# Patient Record
Sex: FEMALE | Race: WHITE | Employment: PART TIME | URBAN - METROPOLITAN AREA
[De-identification: names, ages, dates, MRNs, and addresses within clinical notes are randomized per-mention and may not be internally consistent; named-entity substitution may affect disease eponyms.]

---

## 2017-11-21 ENCOUNTER — TELEPHONE (OUTPATIENT)
Dept: FAMILY MEDICINE CLINIC | Age: 22
End: 2017-11-21

## 2017-11-21 RX ORDER — AZITHROMYCIN 250 MG/1
TABLET, FILM COATED ORAL
Qty: 1 PACKET | Refills: 0 | Status: SHIPPED | OUTPATIENT
Start: 2017-11-21 | End: 2017-12-01

## 2017-12-11 RX ORDER — ETONOGESTREL/ETHINYL ESTRADIOL .12-.015MG
RING, VAGINAL VAGINAL
Qty: 3 EACH | Refills: 2 | Status: SHIPPED | OUTPATIENT
Start: 2017-12-11 | End: 2018-08-17 | Stop reason: SDUPTHER

## 2018-08-17 RX ORDER — ETONOGESTREL/ETHINYL ESTRADIOL .12-.015MG
RING, VAGINAL VAGINAL
Qty: 4 EACH | Refills: 3 | Status: SHIPPED | OUTPATIENT
Start: 2018-08-17

## 2022-12-04 PROBLEM — F33.0 MILD EPISODE OF RECURRENT MAJOR DEPRESSIVE DISORDER (HCC): Status: ACTIVE | Noted: 2019-04-04

## 2022-12-04 RX ORDER — FLUOXETINE HYDROCHLORIDE 40 MG/1
80 CAPSULE ORAL DAILY
COMMUNITY
Start: 2019-05-07 | End: 2022-12-05 | Stop reason: ALTCHOICE

## 2022-12-04 SDOH — HEALTH STABILITY: PHYSICAL HEALTH: ON AVERAGE, HOW MANY DAYS PER WEEK DO YOU ENGAGE IN MODERATE TO STRENUOUS EXERCISE (LIKE A BRISK WALK)?: 1 DAY

## 2022-12-04 SDOH — HEALTH STABILITY: PHYSICAL HEALTH: ON AVERAGE, HOW MANY MINUTES DO YOU ENGAGE IN EXERCISE AT THIS LEVEL?: 30 MIN

## 2022-12-04 ASSESSMENT — SOCIAL DETERMINANTS OF HEALTH (SDOH)
WITHIN THE LAST YEAR, HAVE YOU BEEN HUMILIATED OR EMOTIONALLY ABUSED IN OTHER WAYS BY YOUR PARTNER OR EX-PARTNER?: NO
WITHIN THE LAST YEAR, HAVE YOU BEEN AFRAID OF YOUR PARTNER OR EX-PARTNER?: NO
WITHIN THE LAST YEAR, HAVE TO BEEN RAPED OR FORCED TO HAVE ANY KIND OF SEXUAL ACTIVITY BY YOUR PARTNER OR EX-PARTNER?: NO
WITHIN THE LAST YEAR, HAVE YOU BEEN KICKED, HIT, SLAPPED, OR OTHERWISE PHYSICALLY HURT BY YOUR PARTNER OR EX-PARTNER?: NO

## 2022-12-04 ASSESSMENT — ENCOUNTER SYMPTOMS
COUGH: 0
SHORTNESS OF BREATH: 0
DIARRHEA: 0
CONSTIPATION: 0
VOMITING: 0
ABDOMINAL PAIN: 0
NAUSEA: 0

## 2022-12-05 ENCOUNTER — OFFICE VISIT (OUTPATIENT)
Dept: FAMILY MEDICINE CLINIC | Age: 27
End: 2022-12-05
Payer: MEDICAID

## 2022-12-05 ENCOUNTER — NURSE ONLY (OUTPATIENT)
Dept: LAB | Age: 27
End: 2022-12-05

## 2022-12-05 VITALS
HEIGHT: 67 IN | WEIGHT: 181.8 LBS | BODY MASS INDEX: 28.53 KG/M2 | SYSTOLIC BLOOD PRESSURE: 118 MMHG | HEART RATE: 82 BPM | RESPIRATION RATE: 16 BRPM | DIASTOLIC BLOOD PRESSURE: 78 MMHG | TEMPERATURE: 97.5 F

## 2022-12-05 DIAGNOSIS — Z11.4 ENCOUNTER FOR SCREENING FOR HIV: ICD-10-CM

## 2022-12-05 DIAGNOSIS — Z23 FLU VACCINE NEED: ICD-10-CM

## 2022-12-05 DIAGNOSIS — Z13.31 ENCOUNTER FOR SCREENING FOR DEPRESSION: ICD-10-CM

## 2022-12-05 DIAGNOSIS — F33.0 MILD EPISODE OF RECURRENT MAJOR DEPRESSIVE DISORDER (HCC): ICD-10-CM

## 2022-12-05 DIAGNOSIS — Z11.1 VISIT FOR TB SKIN TEST: ICD-10-CM

## 2022-12-05 DIAGNOSIS — Z11.59 ENCOUNTER FOR HEPATITIS C SCREENING TEST FOR LOW RISK PATIENT: ICD-10-CM

## 2022-12-05 DIAGNOSIS — Z00.00 WELL ADULT EXAM: Primary | ICD-10-CM

## 2022-12-05 DIAGNOSIS — G47.00 INSOMNIA, UNSPECIFIED TYPE: ICD-10-CM

## 2022-12-05 DIAGNOSIS — Z23 NEED FOR COVID-19 VACCINE: ICD-10-CM

## 2022-12-05 LAB
ALBUMIN SERPL-MCNC: 4.6 G/DL (ref 3.5–5.1)
ALP BLD-CCNC: 52 U/L (ref 38–126)
ALT SERPL-CCNC: 13 U/L (ref 11–66)
ANION GAP SERPL CALCULATED.3IONS-SCNC: 16 MEQ/L (ref 8–16)
AST SERPL-CCNC: 14 U/L (ref 5–40)
BASOPHILS # BLD: 0.7 %
BASOPHILS ABSOLUTE: 0.1 THOU/MM3 (ref 0–0.1)
BILIRUB SERPL-MCNC: 0.3 MG/DL (ref 0.3–1.2)
BUN BLDV-MCNC: 10 MG/DL (ref 7–22)
CALCIUM SERPL-MCNC: 9.5 MG/DL (ref 8.5–10.5)
CHLORIDE BLD-SCNC: 104 MEQ/L (ref 98–111)
CO2: 21 MEQ/L (ref 23–33)
CREAT SERPL-MCNC: 0.6 MG/DL (ref 0.4–1.2)
EOSINOPHIL # BLD: 0.9 %
EOSINOPHILS ABSOLUTE: 0.1 THOU/MM3 (ref 0–0.4)
ERYTHROCYTE [DISTWIDTH] IN BLOOD BY AUTOMATED COUNT: 12.8 % (ref 11.5–14.5)
ERYTHROCYTE [DISTWIDTH] IN BLOOD BY AUTOMATED COUNT: 39.8 FL (ref 35–45)
GFR SERPL CREATININE-BSD FRML MDRD: > 60 ML/MIN/1.73M2
GLUCOSE BLD-MCNC: 93 MG/DL (ref 70–108)
HCT VFR BLD CALC: 35.3 % (ref 37–47)
HEMOGLOBIN: 11.8 GM/DL (ref 12–16)
HEPATITIS C ANTIBODY: NEGATIVE
IMMATURE GRANS (ABS): 0.02 THOU/MM3 (ref 0–0.07)
IMMATURE GRANULOCYTES: 0.2 %
LYMPHOCYTES # BLD: 20.5 %
LYMPHOCYTES ABSOLUTE: 1.8 THOU/MM3 (ref 1–4.8)
MCH RBC QN AUTO: 28.8 PG (ref 26–33)
MCHC RBC AUTO-ENTMCNC: 33.4 GM/DL (ref 32.2–35.5)
MCV RBC AUTO: 86.1 FL (ref 81–99)
MONOCYTES # BLD: 5.8 %
MONOCYTES ABSOLUTE: 0.5 THOU/MM3 (ref 0.4–1.3)
NUCLEATED RED BLOOD CELLS: 0 /100 WBC
PLATELET # BLD: 328 THOU/MM3 (ref 130–400)
PMV BLD AUTO: 10.6 FL (ref 9.4–12.4)
POTASSIUM SERPL-SCNC: 4.4 MEQ/L (ref 3.5–5.2)
RBC # BLD: 4.1 MILL/MM3 (ref 4.2–5.4)
SEG NEUTROPHILS: 71.9 %
SEGMENTED NEUTROPHILS ABSOLUTE COUNT: 6.3 THOU/MM3 (ref 1.8–7.7)
SODIUM BLD-SCNC: 141 MEQ/L (ref 135–145)
T4 FREE: 1.33 NG/DL (ref 0.93–1.76)
TOTAL PROTEIN: 7.1 G/DL (ref 6.1–8)
TSH SERPL DL<=0.05 MIU/L-ACNC: 1.8 UIU/ML (ref 0.4–4.2)
WBC # BLD: 8.8 THOU/MM3 (ref 4.8–10.8)

## 2022-12-05 PROCEDURE — 86580 TB INTRADERMAL TEST: CPT | Performed by: FAMILY MEDICINE

## 2022-12-05 PROCEDURE — 90674 CCIIV4 VAC NO PRSV 0.5 ML IM: CPT | Performed by: FAMILY MEDICINE

## 2022-12-05 PROCEDURE — 99385 PREV VISIT NEW AGE 18-39: CPT | Performed by: STUDENT IN AN ORGANIZED HEALTH CARE EDUCATION/TRAINING PROGRAM

## 2022-12-05 PROCEDURE — 0124A COVID-19, PFIZER BIVALENT BOOSTER, (AGE 12Y+), IM, 30 MCG/0.3 ML: CPT | Performed by: FAMILY MEDICINE

## 2022-12-05 PROCEDURE — 90471 IMMUNIZATION ADMIN: CPT | Performed by: FAMILY MEDICINE

## 2022-12-05 PROCEDURE — 96160 PT-FOCUSED HLTH RISK ASSMT: CPT | Performed by: STUDENT IN AN ORGANIZED HEALTH CARE EDUCATION/TRAINING PROGRAM

## 2022-12-05 PROCEDURE — 91312 COVID-19, PFIZER BIVALENT BOOSTER, (AGE 12Y+), IM, 30 MCG/0.3 ML: CPT | Performed by: FAMILY MEDICINE

## 2022-12-05 RX ORDER — M-VIT,TX,IRON,MINS/CALC/FOLIC 27MG-0.4MG
1 TABLET ORAL DAILY
COMMUNITY

## 2022-12-05 RX ORDER — FLUOXETINE HYDROCHLORIDE 20 MG/1
20 CAPSULE ORAL DAILY
Qty: 30 CAPSULE | Refills: 3 | Status: SHIPPED | OUTPATIENT
Start: 2022-12-05

## 2022-12-05 SDOH — ECONOMIC STABILITY: FOOD INSECURITY: WITHIN THE PAST 12 MONTHS, YOU WORRIED THAT YOUR FOOD WOULD RUN OUT BEFORE YOU GOT MONEY TO BUY MORE.: NEVER TRUE

## 2022-12-05 SDOH — ECONOMIC STABILITY: FOOD INSECURITY: WITHIN THE PAST 12 MONTHS, THE FOOD YOU BOUGHT JUST DIDN'T LAST AND YOU DIDN'T HAVE MONEY TO GET MORE.: NEVER TRUE

## 2022-12-05 ASSESSMENT — PATIENT HEALTH QUESTIONNAIRE - PHQ9
4. FEELING TIRED OR HAVING LITTLE ENERGY: 1
SUM OF ALL RESPONSES TO PHQ9 QUESTIONS 1 & 2: 0
5. POOR APPETITE OR OVEREATING: 0
9. THOUGHTS THAT YOU WOULD BE BETTER OFF DEAD, OR OF HURTING YOURSELF: 0
7. TROUBLE CONCENTRATING ON THINGS, SUCH AS READING THE NEWSPAPER OR WATCHING TELEVISION: 1
1. LITTLE INTEREST OR PLEASURE IN DOING THINGS: 0
3. TROUBLE FALLING OR STAYING ASLEEP: 3
SUM OF ALL RESPONSES TO PHQ QUESTIONS 1-9: 5
2. FEELING DOWN, DEPRESSED OR HOPELESS: 0
6. FEELING BAD ABOUT YOURSELF - OR THAT YOU ARE A FAILURE OR HAVE LET YOURSELF OR YOUR FAMILY DOWN: 0
SUM OF ALL RESPONSES TO PHQ QUESTIONS 1-9: 5
8. MOVING OR SPEAKING SO SLOWLY THAT OTHER PEOPLE COULD HAVE NOTICED. OR THE OPPOSITE, BEING SO FIGETY OR RESTLESS THAT YOU HAVE BEEN MOVING AROUND A LOT MORE THAN USUAL: 0
10. IF YOU CHECKED OFF ANY PROBLEMS, HOW DIFFICULT HAVE THESE PROBLEMS MADE IT FOR YOU TO DO YOUR WORK, TAKE CARE OF THINGS AT HOME, OR GET ALONG WITH OTHER PEOPLE: 0

## 2022-12-05 ASSESSMENT — SOCIAL DETERMINANTS OF HEALTH (SDOH): HOW HARD IS IT FOR YOU TO PAY FOR THE VERY BASICS LIKE FOOD, HOUSING, MEDICAL CARE, AND HEATING?: NOT HARD AT ALL

## 2022-12-05 NOTE — PROGRESS NOTES
[id number] [facility]. [address]  [facility] [address]  Dept: [phone]  Loc: [phone]     Jerrod Alfonso is a 28 y.o. female who presents today for:  Chief Complaint   Patient presents with    New Patient     Establish care, previously saw a physician in [place]         Goals    None         HPI:     HPI  48-year-old female with PMH of anxiety, depression    PMH: depression, anxiety, bicornate uterus  PSH: omphalaocele, cholecystectomy  Family hx: father (gout),mother (diabetes, hypertension) maternal grandfather (dementia, diabetes), maternal grandmother (breast cancer, emphysema. Heart failure),  Social history: Medical student    Interval history:    Depression/anxiety/insomnia: history of depression and anxiety, previously on Prozac 40 mg PO daily, which she has been off for about 6 months. Depression intermittently active. Right now has been feeling more depressed / stressed / anxious about medical school, upcoming wedding, residency applications. Mood has been low, sleep is poor, reporting fatigue, difficulty concentrating. Does not feel like she gets a lot of time to do things she likes. Previously tried hydroxyzine for anxiety, but does not do well with antihistamines. Melatonin does not seem to last during the night. Seems to have trouble with staying asleep and early morning awakenings. Needs Flu vaccine and TB testing  Would like Covid booster    Last pap: Spring 2020 - trying to make appt with [facility]. Would like some basic labs, and okay to screen for HIV and Hep C.     Current Outpatient Medications   Medication Sig Dispense Refill    Multiple Vitamins-Minerals (THERAPEUTIC MULTIVITAMIN-MINERALS) tablet Take 1 tablet by mouth daily      FLUoxetine (PROZAC) 20 MG capsule Take 1 capsule by mouth daily 30 capsule 3    NUVARING 0.12-0.015 MG/24HR vaginal ring INSERT 1 RING VAGINALLY AND LEAVE IN PLACE FOR 3 WEEKS (21 DAYS), THEN REMOVE FOR 1 WEEK 4 each 3     No current facility-administered medications for this visit. Food Insecurity: No Food Insecurity    Worried About Running Out of Food in the Last Year: Never true    Ran Out of Food in the Last Year: Never true       Health Maintenance   Topic Date Due    Varicella vaccine (1 of 2 - 2-dose childhood series) Never done    HIV screen  Never done    Pap smear  12/18/2017    COVID-19 Vaccine (2 - Pfizer series) 12/26/2022    Depression Monitoring  12/05/2023    DTaP/Tdap/Td vaccine (7 - Td or Tdap) 12/13/2023    Hib vaccine  Completed    Meningococcal (ACWY) vaccine  Completed    Flu vaccine  Completed    Hepatitis C screen  Completed    Hepatitis A vaccine  Aged Out    Pneumococcal 0-64 years Vaccine  Aged Out       ROS:      Review of Systems   Constitutional:  Negative for chills, fatigue and fever. HENT:  Negative for congestion. Eyes:  Negative for visual disturbance. Respiratory:  Negative for cough and shortness of breath. Cardiovascular:  Negative for chest pain and palpitations. Gastrointestinal:  Negative for abdominal pain, constipation, diarrhea, nausea and vomiting. Genitourinary:  Negative for dysuria and menstrual problem. Musculoskeletal:  Negative for arthralgias. Skin:  Negative for rash. Neurological:  Negative for dizziness, light-headedness and headaches. Psychiatric/Behavioral:  Positive for dysphoric mood and sleep disturbance. The patient is nervous/anxious. Objective:     Vitals:    12/05/22 0842   BP: 118/78   Site: Left Upper Arm   Position: Sitting   Cuff Size: Medium Adult   Pulse: 82   Resp: 16   Temp: 97.5 °F (36.4 °C)   TempSrc: Temporal   Weight: 181 lb 12.8 oz (82.5 kg)   Height: 5' 7\" (1.702 m)       Body mass index is 28.47 kg/m².     Wt Readings from Last 3 Encounters:   12/05/22 181 lb 12.8 oz (82.5 kg)   09/14/15 150 lb 9.6 oz (68.3 kg)   08/20/15 146 lb 8 oz (66.5 kg)     BP Readings from Last 3 Encounters:   12/05/22 118/78   09/14/15 110/70   08/20/15 130/70       Physical Exam  Vitals reviewed. Constitutional:       General: She is not in acute distress. Appearance: Normal appearance. She is not ill-appearing. HENT:      Head: Normocephalic and atraumatic. Right Ear: External ear normal.      Left Ear: External ear normal.      Nose: Nose normal.      Mouth/Throat:      Mouth: Mucous membranes are moist.   Eyes:      General:         Right eye: No discharge. Left eye: No discharge. Conjunctiva/sclera: Conjunctivae normal.   Cardiovascular:      Rate and Rhythm: Normal rate and regular rhythm. Pulses: Normal pulses. Heart sounds: Normal heart sounds. No murmur heard. Pulmonary:      Effort: Pulmonary effort is normal. No respiratory distress. Breath sounds: Normal breath sounds. No wheezing, rhonchi or rales. Abdominal:      General: Abdomen is flat. Bowel sounds are normal. There is no distension. Palpations: Abdomen is soft. Tenderness: There is no abdominal tenderness. Musculoskeletal:         General: Normal range of motion. Cervical back: Normal range of motion and neck supple. Right lower leg: No edema. Left lower leg: No edema. Skin:     General: Skin is warm and dry. Findings: No rash. Neurological:      General: No focal deficit present. Mental Status: She is alert and oriented to person, place, and time. Psychiatric:         Mood and Affect: Mood normal.         Behavior: Behavior normal.         Thought Content: Thought content normal.         Judgment: Judgment normal.       Assessment / Plan:     1. Well adult exam  - Overall healthy adult. Check labs. Vaccines today. 2. Mild episode of recurrent major depressive disorder (HCC)  - Chronic, uncontrolled at this time. Re-titrate Prozac at 20 mg PO daily. Follow-up in 4-6 weeks. Check labs. - FLUoxetine (PROZAC) 20 MG capsule;  Take 1 capsule by mouth daily  Dispense: 30 capsule; Refill: 3  - CBC with Auto Differential; Future  - Comprehensive Metabolic Panel; Future  - TSH; Future  - T4, Free; Future    3. Encounter for screening for depression  - PHQ-9 = 5. Plan as above. - [place] DEPRESSION SCREEN ANNUAL    4. Insomnia, unspecified type  - Likely related to underlying depression and anxiety. Re-titrate Prozac at 20 mg PO daily. May try Melatonin MidNite. Discussed sleep hygiene. 5. Encounter for screening for HIV  - HIV Screen; Future    6. Encounter for hepatitis C screening test for low risk patient  - Hepatitis C Antibody; Future    7. Visit for TB skin test  - Follow-up in 2-3 days for nurse visit to read. - Mantoux testing    8. Flu vaccine need  - Influenza, FLUCELVAX, (age 10 mo+), IM, Preservative Free, 0.5 mL    9. Need for COVID-19 vaccine  - COVID-19, PFIZER Bivalent BOOSTER, (age 12y+), IM, 30 mcg/0.3 mL    Health Maintenance Due   Topic Date Due    Varicella vaccine (1 of 2 - 2-dose childhood series) Never done    HIV screen  Never done    Pap smear  12/18/2017           Return in about 4 weeks (around 1/2/2023). Medications Prescribed:  Orders Placed This Encounter   Medications    FLUoxetine (PROZAC) 20 MG capsule     Sig: Take 1 capsule by mouth daily     Dispense:  30 capsule     Refill:  3         Future Appointments   Date Time Provider Ted Luna   12/7/2022  8:30 AM SCHEDULE, [facility]MA VISIT [facility] [place]   1/6/2023  8:00 AM Rubén Brian MD [facility] [address] [facility]         Patient given educational materials - see patient instructions. Discussed use, benefit, and side effects of prescribed medications. All patient questions answered. Patient voiced understanding. Reviewed health maintenance. Instructed to continue current medications, diet and exercise. Patient agreed with treatment plan. Follow up as directed.      Electronically signed by Rubén Brian MD on 12/5/2022 at 9:50 PM

## 2022-12-05 NOTE — PROGRESS NOTES
TB skin test, right forearm     Immunizations Administered       Name Date Dose Route    Influenza, FLUCELVAX, (age 10 mo+), MDCK, PF, 0.5mL 12/5/2022 0.5 mL Intramuscular    Site: Deltoid- Left    Lot: [id number]    NDC: 86751-741-95    PPD Test 12/5/2022 0.1 mL Intradermal    Site: Right Forearm    Lot: [id number]    NDC: 27129-881-92            Most recent Vaccine Information Sheet dated [id number] given to pt

## 2022-12-05 NOTE — PROGRESS NOTES
S: 28 y.o. 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  See notes for additional details. BP Readings from Last 3 Encounters:   12/05/22 118/78   09/14/15 110/70   08/20/15 130/70     Wt Readings from Last 3 Encounters:   12/05/22 181 lb 12.8 oz (82.5 kg)   09/14/15 150 lb 9.6 oz (68.3 kg)   08/20/15 146 lb 8 oz (66.5 kg)           O: VS:  height is 5' 7\" (1.702 m) and weight is 181 lb 12.8 oz (82.5 kg). Her temporal temperature is 97.5 °F (36.4 °C). Her blood pressure is 118/78 and her pulse is 82. Her respiration is 16. Diagnosis Orders   1. Well adult exam        2. Encounter for screening for HIV  HIV Screen      3. Encounter for hepatitis C screening test for low risk patient  Hepatitis C Antibody      4. Encounter for screening for depression  [place] DEPRESSION SCREEN ANNUAL      5. Mild episode of recurrent major depressive disorder (HCC)  FLUoxetine (PROZAC) 20 MG capsule    CBC with Auto Differential    Comprehensive Metabolic Panel    TSH    T4, Free      6. Visit for TB skin test  Mantoux testing      7. Flu vaccine need  Influenza, FLUCELVAX, (age 10 mo+), IM, Preservative Free, 0.5 mL      8. Need for COVID-19 vaccine  COVID-19, PFIZER Bivalent BOOSTER, (age 12y+), IM, 30 mcg/0.3 mL          Plan    Healthy 29 yo female, routine HM measures reviewed and vaccinations to be updated today. Mood symptoms increased with more stress currently, re-titrate fluoxetine with follow up in 4-6 wks. Lifestyle measures, self care reviewed.        Health Maintenance Due   Topic Date Due    COVID-19 Vaccine (1) Never done    Varicella vaccine (1 of 2 - 2-dose childhood series) Never done    Depression Monitoring  Never done    HIV screen  Never done    Hepatitis C screen  Never done    Pap smear  12/18/2017    Flu vaccine (1) 08/01/2022         Attending Physician Statement  I have discussed the case, including pertinent history and exam findings with the resident. I agree with the documented assessment and plan as documented by the resident.   GE modifier added to this encounter      Colton Robertson MD 12/5/2022 9:21 AM

## 2022-12-05 NOTE — PROGRESS NOTES
Immunizations Administered       Name Date Dose Route    COVID-19, PFIZER Bivalent BOOSTER, (age 12y+), IM, 30 mcg/0.3 mL dose 12/5/2022 0.3 mL Intramuscular    Site: Deltoid- Left    Lot: [id number]    NDC: [address]-9    Influenza, FLUCELVAX, (age 10 mo+), MDCK, PF, 0.5mL 12/5/2022 0.5 mL Intramuscular    Site: Deltoid- Left    Lot: [id number]    NDC: 70227-953-87    PPD Test 12/5/2022 0.1 mL Intradermal    Site: Right Forearm    Lot: [id number]    NDC: 52399-399-40         Patient tolerated well

## 2022-12-06 LAB — HIV AG/AB: NONREACTIVE

## 2022-12-06 NOTE — RESULT ENCOUNTER NOTE
Dale Forte, I reviewed your labs. Overall they looked good - renal, liver and thyroid function were all normal. Hep C and HIV screens were negative. You are a tad dehydrated. You have a very slight normocytic anemia. I recommend ensuring that you are eating a balanced diet and making sure you are getting enough iron. Let me know if you have any questions or concerns. Have a good Roya and New Year!

## 2022-12-07 ENCOUNTER — NURSE ONLY (OUTPATIENT)
Dept: FAMILY MEDICINE CLINIC | Age: 27
End: 2022-12-07

## 2022-12-19 ENCOUNTER — NURSE ONLY (OUTPATIENT)
Dept: FAMILY MEDICINE CLINIC | Age: 27
End: 2022-12-19
Payer: MEDICAID

## 2022-12-19 DIAGNOSIS — Z11.1 VISIT FOR TB SKIN TEST: Primary | ICD-10-CM

## 2022-12-19 PROCEDURE — 86580 TB INTRADERMAL TEST: CPT | Performed by: FAMILY MEDICINE

## 2022-12-19 NOTE — PROGRESS NOTES
Immunizations Administered       Name Date Dose Route    PPD Test 12/19/2022 0.1 mL Intradermal    Site: Left Forearm    Lot: [id number]    NDC: 96258-166-44

## 2022-12-21 ENCOUNTER — NURSE ONLY (OUTPATIENT)
Dept: FAMILY MEDICINE CLINIC | Age: 27
End: 2022-12-21

## 2022-12-21 DIAGNOSIS — Z11.1 ENCOUNTER FOR READING OF TUBERCULIN SKIN TEST: Primary | ICD-10-CM

## 2022-12-21 NOTE — PROGRESS NOTES
PPD Reading Note  PPD read and results entered in [address]. Result: 0 mm induration.   Interpretation: No Reaction  If test not read within 48-72 hours of initial placement, patient advised to repeat in other arm 1-3 weeks after this test.  Allergic reaction: no

## 2023-02-22 SDOH — ECONOMIC STABILITY: INCOME INSECURITY: HOW HARD IS IT FOR YOU TO PAY FOR THE VERY BASICS LIKE FOOD, HOUSING, MEDICAL CARE, AND HEATING?: NOT HARD AT ALL

## 2023-02-22 SDOH — ECONOMIC STABILITY: FOOD INSECURITY: WITHIN THE PAST 12 MONTHS, YOU WORRIED THAT YOUR FOOD WOULD RUN OUT BEFORE YOU GOT MONEY TO BUY MORE.: NEVER TRUE

## 2023-02-22 SDOH — ECONOMIC STABILITY: HOUSING INSECURITY
IN THE LAST 12 MONTHS, WAS THERE A TIME WHEN YOU DID NOT HAVE A STEADY PLACE TO SLEEP OR SLEPT IN A SHELTER (INCLUDING NOW)?: NO

## 2023-02-22 SDOH — ECONOMIC STABILITY: TRANSPORTATION INSECURITY
IN THE PAST 12 MONTHS, HAS LACK OF TRANSPORTATION KEPT YOU FROM MEETINGS, WORK, OR FROM GETTING THINGS NEEDED FOR DAILY LIVING?: NO

## 2023-02-22 SDOH — ECONOMIC STABILITY: FOOD INSECURITY: WITHIN THE PAST 12 MONTHS, THE FOOD YOU BOUGHT JUST DIDN'T LAST AND YOU DIDN'T HAVE MONEY TO GET MORE.: NEVER TRUE

## 2023-02-23 ASSESSMENT — ENCOUNTER SYMPTOMS
NAUSEA: 0
COUGH: 0
VOMITING: 0
ABDOMINAL PAIN: 0
DIARRHEA: 0
SHORTNESS OF BREATH: 0
CONSTIPATION: 0

## 2023-02-23 NOTE — PROGRESS NOTES
[address] [address]. [address]  [facility] [address]  Dept: [phone]  Loc: [phone]     Stevens Siemens is a 28 y.o. female who presents today for:  Chief Complaint   Patient presents with    Follow-up     Rectal bleeding a few months, follow up on anxiety and insomnia        Goals    None         HPI:     HPI  76-year-old female with PMH of depression and anxiety here for follow-up. Depression / anxiety: patient was prescribed Prozac 20 mg PO daily at last appt 12/5/22. Symptoms: anhedonia, insomnia, fatigue, feeling like a failure, feeling slow, fidgeting  Side effects: none  SI / HI: no   She is getting  tomorrow (2/25/23). She is having more anxiety and insomnia now. Feeling more wedding jitters about logistics, family stuff, but not about the marriage. 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 There is a lot of stress right now. Patient continues to have rectal bleeding - has hemorrhoids, every couple weeks. Not painful. No other abdominal complaints. No mucus in stool. Blood on tissue, but not in or on stool. No family history of IBD, just IBS. Grandmother did have diverticulitis    Last pap: spring 2020 - due. Wants to see ObGYN for pap smear. Does have bicornuate uterus. Has history of dysmenorrhea on NuvaRing. Used to miss school due to dysmenorrhea. Her fiance needs to stay away when she gets her menstrual cycle because she can sometimes be in so much pain. She usually drinks a lot of water / caffeine, uses NSAIDs and a heating pad.        Current Outpatient Medications   Medication Sig Dispense Refill    FLUoxetine (PROZAC) 20 MG capsule Take 1 capsule by mouth daily 30 capsule 3    etonogestrel-ethinyl estradiol (NUVARING) 0.12-0.015 MG/24HR vaginal ring INSERT 1 RING VAGINALLY AND LEAVE IN PLACE FOR 3 WEEKS (21 DAYS), THEN REMOVE FOR 1 WEEK 4 each 3    Multiple Vitamins-Minerals (THERAPEUTIC MULTIVITAMIN-MINERALS) tablet Take 1 tablet by mouth daily       No current facility-administered medications for this visit.       Food Insecurity: No Food Insecurity    Worried About Running Out of Food in the Last Year: Never true    Ran Out of Food in the Last Year: Never true       Health Maintenance   Topic Date Due    Varicella vaccine (1 of 2 - 2-dose childhood series) Never done    Pap smear  12/18/2017    COVID-19 Vaccine (2 - Pfizer series) 12/26/2022    Depression Monitoring  12/05/2023    DTaP/Tdap/Td vaccine (7 - Td or Tdap) 12/13/2023    Hib vaccine  Completed    Meningococcal (ACWY) vaccine  Completed    Flu vaccine  Completed    Hepatitis C screen  Completed    HIV screen  Completed    Hepatitis A vaccine  Aged Out    Pneumococcal 0-64 years Vaccine  Aged Out       ROS:      Review of Systems   Constitutional:  Negative for chills, fatigue and fever.   HENT:  Negative for congestion.    Eyes:  Negative for visual disturbance.   Respiratory:  Negative for cough and shortness of breath.    Cardiovascular:  Negative for chest pain and palpitations.   Gastrointestinal:  Negative for abdominal pain, constipation, diarrhea, nausea and vomiting.        Rectal bleeding   Genitourinary:  Negative for dysuria and menstrual problem.        Dysmenorrhea   Musculoskeletal:  Negative for arthralgias.   Skin:  Negative for rash.   Neurological:  Negative for dizziness, light-headedness and headaches.   Psychiatric/Behavioral:  Positive for decreased concentration and sleep disturbance. Negative for dysphoric mood and suicidal ideas. The patient is nervous/anxious.        Objective:     Vitals:    02/24/23 0915   BP: 122/74   Site: Left Upper Arm   Position: Sitting   Cuff Size: Medium Adult   Pulse: 81   Temp: 97.1 °F (36.2 °C)   TempSrc: Temporal   SpO2: 99%   Weight: 173 lb (78.5 kg)   Height: 5' 7\" (1.702 m)       Body mass index is 27.1 kg/m².    Wt Readings from Last 3  Encounters:   02/24/23 173 lb (78.5 kg)   12/05/22 181 lb 12.8 oz (82.5 kg)   09/14/15 150 lb 9.6 oz (68.3 kg)     BP Readings from Last 3 Encounters:   02/24/23 122/74   12/05/22 118/78   09/14/15 110/70       Physical Exam  Vitals reviewed. Exam conducted with a chaperone present. Constitutional:       General: She is not in acute distress. Appearance: Normal appearance. She is not ill-appearing. HENT:      Head: Normocephalic and atraumatic. Right Ear: External ear normal.      Left Ear: External ear normal.      Nose: Nose normal.      Mouth/Throat:      Mouth: Mucous membranes are moist.   Eyes:      General:         Right eye: No discharge. Left eye: No discharge. Conjunctiva/sclera: Conjunctivae normal.   Cardiovascular:      Rate and Rhythm: Normal rate and regular rhythm. Pulses: Normal pulses. Heart sounds: Normal heart sounds. No murmur heard. Pulmonary:      Effort: Pulmonary effort is normal. No respiratory distress. Breath sounds: Normal breath sounds. No wheezing, rhonchi or rales. Abdominal:      General: Abdomen is flat. Bowel sounds are normal. There is no distension. Palpations: Abdomen is soft. There is no mass. Tenderness: There is no abdominal tenderness. Genitourinary:     Rectum: Normal. No anal fissure, external hemorrhoid or internal hemorrhoid. Musculoskeletal:         General: Normal range of motion. Cervical back: Normal range of motion and neck supple. Right lower leg: No edema. Left lower leg: No edema. Skin:     General: Skin is warm and dry. Capillary Refill: Capillary refill takes less than 2 seconds. Findings: No rash. Neurological:      General: No focal deficit present. Mental Status: She is alert and oriented to person, place, and time. Cranial Nerves: No cranial nerve deficit.    Psychiatric:         Mood and Affect: Mood normal.         Behavior: Behavior normal.       Assessment / Plan:     1. Mild episode of recurrent major depressive disorder (HCC)  - Chronic, somewhat controlled. Situationally exacerbated with upcoming wedding with  more anxiety. Will hold off on any changes per patient preferences at this time. No SI or HI. Re-evaluate in 4-6 weeks. Refill and continue Prozac 20 mg PO daily.  - FLUoxetine (PROZAC) 20 MG capsule; Take 1 capsule by mouth daily  Dispense: 30 capsule; Refill: 3    2. Positive depression screening  - Situationally exacerbated with upcoming wedding with  more anxiety. Will hold off on any changes per patient preferences at this time. No SI or HI. Re-evaluate in 4-6 weeks. - FLUoxetine (PROZAC) 20 MG capsule; Take 1 capsule by mouth daily  Dispense: 30 capsule; Refill: 3    3. Dysmenorrhea  - Chronic, stable. Refill and continue Nuvaring. Referral to ObGYN per patient preference. - etonogestrel-ethinyl estradiol (NUVARING) 0.12-0.015 MG/24HR vaginal ring; INSERT 1 RING VAGINALLY AND LEAVE IN PLACE FOR 3 WEEKS (21 DAYS), THEN REMOVE FOR 1 WEEK  Dispense: 4 each; Refill: 3  - Ann Bartholomew MD, Obstetrics & Gynecology, [facility]    4. Rectal bleeding  - Acute - rectal exam without any signs of fissure or external hemorrhoid. Will refer to GI for further workup. - Renae Bauer MD, Gastroenterology, [facility] Maintenance Due   Topic Date Due    Varicella vaccine (1 of 2 - 2-dose childhood series) Never done    Pap smear  12/18/2017    COVID-19 Vaccine (2 - Pfizer series) 12/26/2022           Return in about 6 weeks (around 4/7/2023).       Medications Prescribed:  Orders Placed This Encounter   Medications    FLUoxetine (PROZAC) 20 MG capsule     Sig: Take 1 capsule by mouth daily     Dispense:  30 capsule     Refill:  3    etonogestrel-ethinyl estradiol (NUVARING) 0.12-0.015 MG/24HR vaginal ring     Sig: INSERT 1 RING VAGINALLY AND LEAVE IN PLACE FOR 3 WEEKS (21 DAYS), THEN REMOVE FOR 1 WEEK     Dispense:  4 each     Refill:  3         Future Appointments   Date Time Provider Ted Riverai   4/10/2023  8:20 AM Chauncey Wallace MD [facility] [address] [facility]         Patient given educational materials - see patient instructions. Discussed use, benefit, and side effects of prescribed medications. All patient questions answered. Patient voiced understanding. Reviewed health maintenance. Instructed to continue current medications, diet and exercise. Patient agreed with treatment plan. Follow up as directed. Electronically signed by Chauncey Wallace MD on 2/24/2023 at 3:35 PM PHQ-9 score today: (PHQ-9 Total Score: 10), additional evaluation and assessment performed, follow-up plan includes but not limited to: Medication management and Referral to /Specialist  for evaluation and management.

## 2023-02-24 ENCOUNTER — OFFICE VISIT (OUTPATIENT)
Dept: FAMILY MEDICINE CLINIC | Age: 28
End: 2023-02-24

## 2023-02-24 VITALS
BODY MASS INDEX: 27.15 KG/M2 | DIASTOLIC BLOOD PRESSURE: 74 MMHG | SYSTOLIC BLOOD PRESSURE: 122 MMHG | TEMPERATURE: 97.1 F | WEIGHT: 173 LBS | OXYGEN SATURATION: 99 % | HEART RATE: 81 BPM | HEIGHT: 67 IN

## 2023-02-24 DIAGNOSIS — F33.0 MILD EPISODE OF RECURRENT MAJOR DEPRESSIVE DISORDER (HCC): ICD-10-CM

## 2023-02-24 DIAGNOSIS — Z13.31 POSITIVE DEPRESSION SCREENING: Primary | ICD-10-CM

## 2023-02-24 DIAGNOSIS — N94.6 DYSMENORRHEA: ICD-10-CM

## 2023-02-24 DIAGNOSIS — K62.5 RECTAL BLEEDING: ICD-10-CM

## 2023-02-24 RX ORDER — FLUOXETINE HYDROCHLORIDE 20 MG/1
20 CAPSULE ORAL DAILY
Qty: 30 CAPSULE | Refills: 3 | Status: SHIPPED | OUTPATIENT
Start: 2023-02-24

## 2023-02-24 RX ORDER — ETONOGESTREL AND ETHINYL ESTRADIOL 11.7; 2.7 MG/1; MG/1
INSERT, EXTENDED RELEASE VAGINAL
Qty: 4 EACH | Refills: 3 | Status: SHIPPED | OUTPATIENT
Start: 2023-02-24

## 2023-02-24 ASSESSMENT — PATIENT HEALTH QUESTIONNAIRE - PHQ9
SUM OF ALL RESPONSES TO PHQ QUESTIONS 1-9: 10
6. FEELING BAD ABOUT YOURSELF - OR THAT YOU ARE A FAILURE OR HAVE LET YOURSELF OR YOUR FAMILY DOWN: 2
4. FEELING TIRED OR HAVING LITTLE ENERGY: 3
2. FEELING DOWN, DEPRESSED OR HOPELESS: 0
8. MOVING OR SPEAKING SO SLOWLY THAT OTHER PEOPLE COULD HAVE NOTICED. OR THE OPPOSITE, BEING SO FIGETY OR RESTLESS THAT YOU HAVE BEEN MOVING AROUND A LOT MORE THAN USUAL: 1
7. TROUBLE CONCENTRATING ON THINGS, SUCH AS READING THE NEWSPAPER OR WATCHING TELEVISION: 0
SUM OF ALL RESPONSES TO PHQ QUESTIONS 1-9: 10
9. THOUGHTS THAT YOU WOULD BE BETTER OFF DEAD, OR OF HURTING YOURSELF: 0
1. LITTLE INTEREST OR PLEASURE IN DOING THINGS: 1
SUM OF ALL RESPONSES TO PHQ9 QUESTIONS 1 & 2: 1
10. IF YOU CHECKED OFF ANY PROBLEMS, HOW DIFFICULT HAVE THESE PROBLEMS MADE IT FOR YOU TO DO YOUR WORK, TAKE CARE OF THINGS AT HOME, OR GET ALONG WITH OTHER PEOPLE: 1
3. TROUBLE FALLING OR STAYING ASLEEP: 3
SUM OF ALL RESPONSES TO PHQ QUESTIONS 1-9: 10
5. POOR APPETITE OR OVEREATING: 0
SUM OF ALL RESPONSES TO PHQ QUESTIONS 1-9: 10

## 2023-02-24 NOTE — PROGRESS NOTES
Attending attestation:  I personally performed and participated key or critical portions of the evaluation and management including personally performing the exam and medical decision making. 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 Mood is stable; continue current regimen. Getting  tomorrow. Pre-wedding jitters. 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 No changes desired at this time. Is getting some rectal bleeding. No family history of bowel issues. More on tissue and self-limited. No pain. External exam and internal exam per resident unremarkable. Will refer to gastroenterology for colonoscopy  in the next several months to confirm no serious etiology. Small internal hemorrhoids may be most likely but prefer to exclude more serious causes. Wants to see OB/GYN for pelvic exam.  Referral placed. Contraception management precepted with Dr. Remberto Francisco.          Electronically signed by Tiffany Gloria MD on 2/24/2023 at 9:46 AM

## 2023-02-24 NOTE — PROGRESS NOTES
I reviewed with the resident the medical history and the resident's findings on the physical examination. I discussed with the resident the patient's diagnosis and concur with the plan. Patient needs refill on NuvaRing for her dysmenorrhea.

## 2023-02-24 NOTE — PROGRESS NOTES
Health Maintenance Due   Topic Date Due    Varicella vaccine (1 of 2 - 2-dose childhood series) Never done    Pap smear  12/18/2017    COVID-19 Vaccine (2 - Pfizer series) 12/26/2022

## 2023-03-14 ENCOUNTER — HOSPITAL ENCOUNTER (EMERGENCY)
Age: 28
Discharge: HOME | End: 2023-03-14
Payer: MEDICAID

## 2023-03-14 VITALS
RESPIRATION RATE: 18 BRPM | DIASTOLIC BLOOD PRESSURE: 68 MMHG | OXYGEN SATURATION: 99 % | WEIGHT: 170 LBS | HEIGHT: 66 IN | BODY MASS INDEX: 27.32 KG/M2 | TEMPERATURE: 97.5 F | HEART RATE: 71 BPM | SYSTOLIC BLOOD PRESSURE: 121 MMHG

## 2023-03-14 DIAGNOSIS — H10.32 ACUTE BACTERIAL CONJUNCTIVITIS OF LEFT EYE: Primary | ICD-10-CM

## 2023-03-14 PROCEDURE — 99213 OFFICE O/P EST LOW 20 MIN: CPT | Performed by: NURSE PRACTITIONER

## 2023-03-14 PROCEDURE — 99203 OFFICE O/P NEW LOW 30 MIN: CPT

## 2023-03-14 RX ORDER — OFLOXACIN 3 MG/ML
2 SOLUTION/ DROPS OPHTHALMIC 4 TIMES DAILY
Qty: 1 EACH | Refills: 0 | Status: SHIPPED | OUTPATIENT
Start: 2023-03-14 | End: 2023-03-21

## 2023-03-14 ASSESSMENT — ENCOUNTER SYMPTOMS
RHINORRHEA: 0
WHEEZING: 0
ABDOMINAL PAIN: 0
EYE DISCHARGE: 1
EYE PAIN: 1
EYE REDNESS: 1
CONSTIPATION: 0
COUGH: 0
BLOOD IN STOOL: 0
DIARRHEA: 0
VOMITING: 0
SHORTNESS OF BREATH: 0
NAUSEA: 0

## 2023-03-14 ASSESSMENT — PAIN DESCRIPTION - ORIENTATION: ORIENTATION: LEFT

## 2023-03-14 ASSESSMENT — VISUAL ACUITY: OU: 1

## 2023-03-14 ASSESSMENT — PAIN - FUNCTIONAL ASSESSMENT: PAIN_FUNCTIONAL_ASSESSMENT: 0-10

## 2023-03-14 ASSESSMENT — PAIN DESCRIPTION - LOCATION: LOCATION: EYE

## 2023-03-14 ASSESSMENT — PAIN DESCRIPTION - DESCRIPTORS: DESCRIPTORS: DISCOMFORT

## 2023-03-14 ASSESSMENT — PAIN SCALES - GENERAL: PAINLEVEL_OUTOF10: 5

## 2023-03-14 NOTE — Clinical Note
Marylin Falcon was seen and treated in our emergency department on 3/14/2023. She may return to school on 03/15/2023. If you have any questions or concerns, please don't hesitate to call.       Gavin Larose, TARA - CNP

## 2023-03-14 NOTE — ED PROVIDER NOTES
[facility] Encounter      [address]       Chief Complaint   Patient presents with    Conjunctivitis     left        Nurses Notes reviewed and I agree except as noted in the HPI. HISTORY OF PRESENT ILLNESS   Michelet Valentine is a 33 y.o. female who presents to urgent care with complaint of left-sided eye irritation, redness and discharge that started yesterday. Patient states that her eye started to be irritated yesterday and she woke up with a large amount of discharge in her eye. She denies other symptoms including chest pain, shortness of breath or fever. She denies any visual changes. REVIEW OF SYSTEMS     Review of Systems   Constitutional:  Negative for appetite change, chills, fatigue, fever and unexpected weight change. HENT:  Negative for ear pain and rhinorrhea. Eyes:  Positive for pain, discharge and redness. Negative for visual disturbance. Respiratory:  Negative for cough, shortness of breath and wheezing. Cardiovascular:  Negative for chest pain, palpitations and leg swelling. Gastrointestinal:  Negative for abdominal pain, blood in stool, constipation, diarrhea, nausea and vomiting. Genitourinary:  Negative for dysuria, frequency and hematuria. Musculoskeletal:  Negative for arthralgias, joint swelling and neck stiffness. Skin:  Negative for rash. Neurological:  Negative for dizziness, syncope, weakness, light-headedness and headaches. Hematological:  Does not bruise/bleed easily. PAST MEDICAL HISTORY   History reviewed. No pertinent past medical history. SURGICAL HISTORY     Patient  has a past surgical history that includes omphalocele repair (1994); [place] tooth extraction; Breast lumpectomy; Breast biopsy (Right); Cholecystectomy, laparoscopic (3/8/13); and Breast biopsy.     CURRENT MEDICATIONS       Previous Medications    ETONOGESTREL-ETHINYL ESTRADIOL (NUVARING) 0.12-0.015 MG/24HR VAGINAL RING    INSERT 1 RING VAGINALLY AND LEAVE IN PLACE FOR 3 WEEKS (21 DAYS), THEN REMOVE FOR 1 WEEK    FLUOXETINE (PROZAC) 20 MG CAPSULE    Take 1 capsule by mouth daily    MULTIPLE VITAMINS-MINERALS (THERAPEUTIC MULTIVITAMIN-MINERALS) TABLET    Take 1 tablet by mouth daily       ALLERGIES     Patient is is allergic to sulfa antibiotics and shellfish allergy. FAMILY HISTORY     Patient'sfamily history includes Diabetes in her mother. SOCIAL HISTORY     Patient  reports that she has never smoked. She has never used smokeless tobacco. She reports current alcohol use. She reports that she does not use drugs. PHYSICAL EXAM     ED TRIAGE VITALS  BP: 121/68, Temp: 97.5 °F (36.4 °C), Heart Rate: 71, Resp: 18, SpO2: 99 %  Physical Exam  Vitals and nursing note reviewed. Constitutional:       Appearance: She is well-developed. HENT:      Head: Normocephalic and atraumatic. Eyes:      General: Vision grossly intact. Left eye: Discharge present. Conjunctiva/sclera:      Left eye: Left conjunctiva is injected. Cardiovascular:      Rate and Rhythm: Normal rate and regular rhythm. Heart sounds: Normal heart sounds. No murmur heard. No gallop. Pulmonary:      Effort: Pulmonary effort is normal. No respiratory distress. Breath sounds: Normal breath sounds. No stridor. No decreased breath sounds, wheezing, rhonchi or rales. Chest:      Chest wall: No tenderness. Musculoskeletal:         General: Normal range of motion. Cervical back: Normal range of motion and neck supple. Skin:     General: Skin is warm and dry. Neurological:      Mental Status: She is alert and oriented to person, place, and time. DIAGNOSTIC RESULTS   Labs:No results found for this visit on 03/14/23. IMAGING:  No orders to display     URGENT CARE COURSE:        MDM      Patient  presents to urgent care with complaint of left-sided eye irritation, redness and discharge that started yesterday.  Does appear to have a left-sided conjunctivitis. It does appear to be bacterial in nature with purulent discharge noted in the eye. Patient will be treated with ofloxacin eyedrops. Patient to follow-up with primary care. Patient instructed to go to ER for worsening symptoms, visual changes, inability to keep liquids down, inability to urinate for greater than 8 hours or difficulty breathing. Follow-up with your primary care provider. Use in washcloth with warm water to wipe eye from inner corner to the outer corner using a new area of the washcloth each time you wipe. Medications - No data to display  PROCEDURES:    Procedures    FINALIMPRESSION      1.  Acute bacterial conjunctivitis of left eye        DISPOSITION/PLAN   DISPOSITION Decision To Discharge 03/14/2023 08:21:07 AM    PATIENT REFERRED TO:  Simonne Osler, MD  [address]  [place] Mike [address] [place] [address]    In 2 days    DISCHARGE MEDICATIONS:  New Prescriptions    OFLOXACIN (OCUFLOX) 0.3 % SOLUTION    Place 2 drops into the left eye 4 times daily for 7 days     Current Discharge Medication List          TARA Chilel CNP, APRN - CNP  03/14/23 0522

## 2023-03-14 NOTE — DISCHARGE INSTRUCTIONS
Go to ER for worsening symptoms, visual changes, inability to keep liquids down, inability to urinate for greater than 8 hours or difficulty breathing. Follow-up with your primary care provider. Use in washcloth with warm water to wipe eye from inner corner to the outer corner using a new area of the washcloth each time you wipe.

## 2023-03-14 NOTE — ED NOTES
Pt with complaints of left eye redness and irritation that started yesterday. States she woke up with white \"goopy\" stuff in her left eye.       Jaxon Avila, MIGUEL  28/35/01 [id number]